# Patient Record
Sex: FEMALE | Race: NATIVE HAWAIIAN OR OTHER PACIFIC ISLANDER | URBAN - METROPOLITAN AREA
[De-identification: names, ages, dates, MRNs, and addresses within clinical notes are randomized per-mention and may not be internally consistent; named-entity substitution may affect disease eponyms.]

---

## 2021-04-16 ENCOUNTER — IMMUNIZATION (OUTPATIENT)
Dept: FAMILY MEDICINE CLINIC | Age: 46
End: 2021-04-16

## 2021-04-16 DIAGNOSIS — Z23 ENCOUNTER FOR IMMUNIZATION: Primary | ICD-10-CM

## 2021-04-16 PROCEDURE — 91301 COVID-19, MRNA, LNP-S, PF, 100MCG/0.5ML DOSE(MODERNA): CPT | Performed by: FAMILY MEDICINE

## 2021-04-16 PROCEDURE — 0011A COVID-19, MRNA, LNP-S, PF, 100MCG/0.5ML DOSE(MODERNA): CPT | Performed by: FAMILY MEDICINE

## 2021-05-11 ENCOUNTER — TELEPHONE (OUTPATIENT)
Dept: FAMILY MEDICINE CLINIC | Age: 46
End: 2021-05-11

## 2021-05-11 ENCOUNTER — OFFICE VISIT (OUTPATIENT)
Dept: FAMILY MEDICINE CLINIC | Age: 46
End: 2021-05-11

## 2021-05-11 VITALS
HEART RATE: 74 BPM | BODY MASS INDEX: 48.99 KG/M2 | OXYGEN SATURATION: 98 % | DIASTOLIC BLOOD PRESSURE: 91 MMHG | HEIGHT: 59 IN | SYSTOLIC BLOOD PRESSURE: 166 MMHG | WEIGHT: 243 LBS | TEMPERATURE: 98 F

## 2021-05-11 DIAGNOSIS — T19.2XXA RETAINED TAMPON, INITIAL ENCOUNTER: Primary | ICD-10-CM

## 2021-05-11 PROCEDURE — 99203 OFFICE O/P NEW LOW 30 MIN: CPT | Performed by: FAMILY MEDICINE

## 2021-05-11 NOTE — PROGRESS NOTES
Mita Hoyos is a 39 y.o. female   Chief Complaint   Patient presents with    Menstrual Problem         ASSESSMENT AND PLAN:    1. Retained Tampon - Despite thorough exam, no tampon found in vaginal vault. No visible trauma to cervix, and cervical os is closed. Discussed in depth with patient that there is no where else for the tampon to go. It is most likely that the tampon expelled without her realizing it, that she forgot she took it out, or that she never ended up putting it in. Told patient to watch for symptoms of a retained tampon vigilantly - including fevers/chills , abnormal vaginal discharge, pelvic pain, vomiting/diarrhea, and rash. Patient given the number for the [facility] and instructed to call with any issues and that I would call her back as soon as I got the message. SUBJECTIVE:    HPI:  Kp Cruz. Lizette Guzman is a 39 y.o. female who presents due to a lost tampon. She put one in last night and was unable to find it to take it out this morning. She does not typically use tampons -- this was her second time. However, she has a very heavy flow and wanted to be able to sleep through the night without worrying about soiling herself with blood. She dropped off her son at school early in the morning and went home and back to sleep. She woke up around 1000 and couldn't find the string. She used her hands to search deeper but didn't feel anything. She searched the bathroom and her bed and her clothes and didn't find the tampon. She tried getting into a warm bath to relax and still couldn't feel it. She heard that you can die from leaving in a tampon so she is very worried.     (Also, now that she is here at [facility] she is interested in getting plugged into primary care, in particular for a pap smear)      ROS  negative    BP (!) 166/91 (BP 1 Location: Right arm, BP Patient Position: Sitting)   Pulse 74   Temp 98 °F (36.7 °C)   Ht 4' 11.45\" (1.51 m)   Wt 243 lb (110.2 kg) LMP 05/07/2021   SpO2 98%   BMI 48.34 kg/m²     Physical Exam  Pt is nervous. Attempted to locate tampon digitally unsuccessfully; speculum was then used to explore vaginal cavity twice with no tampon identified. Cervix is closed, vaginal vault has a moderate amount of blood. No CMT, No uterine tenderness.

## 2021-05-11 NOTE — TELEPHONE ENCOUNTER
Tc from the pt. She is stating she is on her period and for the first time has put a tampon in her vagina sometime yesterday and has not been able to remove it to change it. She has tried and tried to locate the string and has taken a bath because a friend told her this would help her remove it. Nothing has worked and she is concerned. The pt stated she does not have a GYN nor does she have any insurance, or money to pay for an ED or Urgent care visit. The pt was scheduled an appt with the [facility] provider at [place] [address] today. She was scheduled for a 3pm appt she was told to arrive at 2:45pm. Dr Jolie French was contacted first and confirmed that the pt could come have the tampon removed at the [facility] today.  Мария Brgaa RN

## 2021-05-14 ENCOUNTER — IMMUNIZATION (OUTPATIENT)
Dept: FAMILY MEDICINE CLINIC | Age: 46
End: 2021-05-14

## 2021-05-14 DIAGNOSIS — Z23 ENCOUNTER FOR IMMUNIZATION: Primary | ICD-10-CM

## 2021-05-14 PROCEDURE — 0012A COVID-19, MRNA, LNP-S, PF, 100MCG/0.5ML DOSE(MODERNA): CPT | Performed by: FAMILY MEDICINE

## 2021-05-14 PROCEDURE — 91301 COVID-19, MRNA, LNP-S, PF, 100MCG/0.5ML DOSE(MODERNA): CPT | Performed by: FAMILY MEDICINE

## 2024-01-04 ENCOUNTER — TELEPHONE (OUTPATIENT)
Age: 49
End: 2024-01-04

## 2024-01-04 NOTE — TELEPHONE ENCOUNTER
Received a call from patient to AN/PAP coordinator number.     Patient said she received a bill for her appointment at the [facility] on 12/19/23. She is wondering why she received a bill and said she would like to cancel her appointment for 1/31/24 if she will have to pay. Per chart review, her appointment on 12/19/23 was with Dr. Danielle at the [facility] clinic. She has had no other procedures or appointments since that time.     I told the patient I will notify our ORW staff. There are sometimes errors with the billing system, but she should not have to pay for the visit. I told the patient to wait for a call from one of our ORWs. She verbalized understanding.     This encounter routed to MADELYN Fischer.     Cheryl Norton RN

## 2024-01-08 ENCOUNTER — TELEPHONE (OUTPATIENT)
Age: 49
End: 2024-01-08

## 2024-01-31 ENCOUNTER — OFFICE VISIT (OUTPATIENT)
Age: 49
End: 2024-01-31

## 2024-01-31 VITALS
WEIGHT: 242.2 LBS | DIASTOLIC BLOOD PRESSURE: 76 MMHG | HEART RATE: 81 BPM | SYSTOLIC BLOOD PRESSURE: 140 MMHG | BODY MASS INDEX: 49.95 KG/M2 | OXYGEN SATURATION: 95 % | TEMPERATURE: 97.7 F

## 2024-01-31 DIAGNOSIS — L91.8 SKIN TAG: ICD-10-CM

## 2024-01-31 DIAGNOSIS — Z76.0 MEDICATION REFILL: ICD-10-CM

## 2024-01-31 DIAGNOSIS — R73.03 PREDIABETES: Primary | ICD-10-CM

## 2024-01-31 DIAGNOSIS — I10 ESSENTIAL HYPERTENSION: ICD-10-CM

## 2024-01-31 PROBLEM — E78.2 MIXED HYPERLIPIDEMIA: Status: ACTIVE | Noted: 2024-01-31

## 2024-01-31 RX ORDER — LISINOPRIL AND HYDROCHLOROTHIAZIDE 12.5; 1 MG/1; MG/1
2 TABLET ORAL DAILY
Qty: 180 TABLET | Refills: 1
Start: 2024-01-31

## 2024-01-31 RX ORDER — EZETIMIBE 10 MG/1
10 TABLET ORAL DAILY
COMMUNITY
Start: 2023-07-28 | End: 2024-01-31 | Stop reason: SDUPTHER

## 2024-01-31 RX ORDER — MULTIVITAMIN
1 TABLET ORAL DAILY
COMMUNITY

## 2024-01-31 RX ORDER — EZETIMIBE 10 MG/1
10 TABLET ORAL DAILY
Qty: 90 TABLET | Refills: 1 | Status: SHIPPED | OUTPATIENT
Start: 2024-01-31

## 2024-01-31 SDOH — ECONOMIC STABILITY: INCOME INSECURITY: HOW HARD IS IT FOR YOU TO PAY FOR THE VERY BASICS LIKE FOOD, HOUSING, MEDICAL CARE, AND HEATING?: NOT HARD AT ALL

## 2024-01-31 SDOH — ECONOMIC STABILITY: FOOD INSECURITY: WITHIN THE PAST 12 MONTHS, YOU WORRIED THAT YOUR FOOD WOULD RUN OUT BEFORE YOU GOT MONEY TO BUY MORE.: NEVER TRUE

## 2024-01-31 SDOH — ECONOMIC STABILITY: FOOD INSECURITY: WITHIN THE PAST 12 MONTHS, THE FOOD YOU BOUGHT JUST DIDN'T LAST AND YOU DIDN'T HAVE MONEY TO GET MORE.: NEVER TRUE

## 2024-01-31 SDOH — ECONOMIC STABILITY: HOUSING INSECURITY
IN THE LAST 12 MONTHS, WAS THERE A TIME WHEN YOU DID NOT HAVE A STEADY PLACE TO SLEEP OR SLEPT IN A SHELTER (INCLUDING NOW)?: NO

## 2024-01-31 ASSESSMENT — PATIENT HEALTH QUESTIONNAIRE - PHQ9
2. FEELING DOWN, DEPRESSED OR HOPELESS: 0
SUM OF ALL RESPONSES TO PHQ QUESTIONS 1-9: 0
SUM OF ALL RESPONSES TO PHQ QUESTIONS 1-9: 0
1. LITTLE INTEREST OR PLEASURE IN DOING THINGS: 0
SUM OF ALL RESPONSES TO PHQ9 QUESTIONS 1 & 2: 0
SUM OF ALL RESPONSES TO PHQ QUESTIONS 1-9: 0
SUM OF ALL RESPONSES TO PHQ QUESTIONS 1-9: 0

## 2024-01-31 ASSESSMENT — ENCOUNTER SYMPTOMS
CONSTIPATION: 0
SHORTNESS OF BREATH: 0
COUGH: 0
NAUSEA: 0
DIARRHEA: 0
ABDOMINAL PAIN: 0

## 2024-01-31 NOTE — PROGRESS NOTES
Chayo Yo is a 48 y.o. female   Chief Complaint   Patient presents with    Hypertension     F/u for htn         ASSESSMENT AND PLAN:    1. Prediabetes  A1C 6.0.  Restart metformin @1000mg daily.  - metFORMIN (GLUCOPHAGE) 1000 MG tablet; Take 1 tablet by mouth daily (with breakfast)  Dispense: 180 tablet; Refill: 1    2. Essential hypertension  Improved but not quite at goal. Increase lisinopril-hctz to 2 tabs daily (20-25)  - lisinopril-hydroCHLOROthiazide (PRINZIDE;ZESTORETIC) 10-12.5 MG per tablet; Take 2 tablets by mouth daily  Dispense: 180 tablet; Refill: 1    3. Skin tag  Pt has appointment 2/9 for lipoma v EIC removal, can remove skin tags at that time.        SUBJECTIVE:    HPI:  Chayo Yo is a 48 y.o. female who presents for followup on HTN and depression.    She is taking vitamins as recommended by another physician. Needed Vit D3.  She switched vitamins because the initial one wasn't available and noticed that with the new vitamins she now has intense hunger/food cravings and if she doesn't eat she gets headaches.  She is basically eating larger portions of her food, not necessarily junk food.  She used to be stable at 220, but now is in 240s.    She is still having some tingling in her hands and her calves. Worse in the morning.    She has a lot of skin tags on her neck.    She was on Metformin 500mg and stated it didn't help so she stopped. She would be interested in trying it again.    She has two bumps near her jaw. She wonders if they're lipomas or related to her teeth. She admits to dental decay.    Depression has been stable. No issues with wellbutrin.    Review of Systems   Constitutional:  Positive for appetite change. Negative for fatigue, fever and unexpected weight change.   Eyes:  Negative for visual disturbance.   Respiratory:  Negative for cough and shortness of breath.    Cardiovascular:  Negative for chest pain and palpitations.   Gastrointestinal:  Negative for

## 2024-01-31 NOTE — PROGRESS NOTES
Patient discharged with AVS. Patient's name and  verified. Patient made aware of prescriptions sent to the pharmacy. Medication review completed. Coupon given for the pharmacy. Patient given dental resources and contact information for '[facility]' program per physician. Patient instructed to contact '[facility]' to schedule a pap smear and mammogram. Patient reminded of her appointment for next week. Patient given an opportunity to voice questions/concerns. All questions addressed. CVAN  Anna Anderson assisted.

## 2024-01-31 NOTE — PROGRESS NOTES
\"Have you been to the ER, urgent care clinic since your last visit?  Hospitalized since your last visit?\"    NO    “Have you seen or consulted any other health care providers outside of [facility] since your last visit?”    NO    “Have you had a colorectal cancer screening such as a colonoscopy/FIT/Cologuard?    NO      “Have you had a pap smear?”    NO

## 2024-02-09 ENCOUNTER — OFFICE VISIT (OUTPATIENT)
Age: 49
End: 2024-02-09

## 2024-02-09 VITALS
DIASTOLIC BLOOD PRESSURE: 86 MMHG | OXYGEN SATURATION: 94 % | WEIGHT: 247 LBS | HEART RATE: 98 BPM | TEMPERATURE: 97.9 F | BODY MASS INDEX: 50.94 KG/M2 | SYSTOLIC BLOOD PRESSURE: 146 MMHG

## 2024-02-09 DIAGNOSIS — L91.8 SKIN TAG: ICD-10-CM

## 2024-02-09 DIAGNOSIS — L72.0 EPIDERMAL INCLUSION CYST: Primary | ICD-10-CM

## 2024-02-09 ASSESSMENT — ENCOUNTER SYMPTOMS
SHORTNESS OF BREATH: 0
ABDOMINAL PAIN: 0
NAUSEA: 0
DIARRHEA: 0
COUGH: 0
CONSTIPATION: 0

## 2024-02-09 NOTE — PROGRESS NOTES
Pt's name and  verified. AVS provided. Medication reviewed and education provided. Wound care instructions provided and reviewed. Pt verbalizes understanding. Time allowed for questions, all questions answered. Constance Ogden RN    
Subjective:    Chayo Yo is a 48 y.o. female who presents for lesion evaluation and possibly removal. There {is/not:8574973318} a personal history of {lesions:435214}. There {is/not:1208414493} a family history of skin cancer. See below for history and description of each lesion.    Oubjective:   Patient appears well.   Vitals:    02/09/24 1456   BP: (!) 146/86   Pulse: 98   Temp:    SpO2:        Skin exam:  Lesion on *** with patient's observations stated as {skin sx:929893}, exam of this area shows {lesion type:928701::\"normal complete skin exam, no suspicious lesions\"}.      {ABOVE THIS USE <DOT> LESION REPEATEDLY TO DESCRIBE AS MANY LESIONS AS NEEDED. THIS STATEMENT WILL DISAPPEAR UPON SIGNING THE NOTE:08838}  Assessment:   {Skin lesion dx:5286}    Procedure Note:   After informed consent was obtained, using {Skin prep:27022::\"betadine\"} for cleansing and {local:24474858} for anesthetic, with sterile technique, {removal technique:930947} was performed. Antibiotic dressing is applied, and wound care instructions provided.  Be alert for any signs of cutaneous infection. The procedure was well tolerated without complications. Follow up: {skin lesion follow up:728293}.     {Additional procedure notes (Optional):549893259}     
up: return for suture removal in 10 days.       Using chlorhexidine for cleansing with sterile technique, scissors were used to remove 14 skin tags. Antibiotic dressing is applied, and wound care instructions provided. The procedure was well tolerated without complications.

## 2024-02-23 ENCOUNTER — OFFICE VISIT (OUTPATIENT)
Age: 49
End: 2024-02-23

## 2024-02-23 VITALS
TEMPERATURE: 98.1 F | DIASTOLIC BLOOD PRESSURE: 79 MMHG | SYSTOLIC BLOOD PRESSURE: 145 MMHG | HEART RATE: 81 BPM | WEIGHT: 248.2 LBS | BODY MASS INDEX: 51.19 KG/M2 | OXYGEN SATURATION: 97 %

## 2024-02-23 DIAGNOSIS — R22.2 MASS OF SUBCUTANEOUS TISSUE OF BACK: ICD-10-CM

## 2024-02-23 DIAGNOSIS — I10 ESSENTIAL HYPERTENSION: ICD-10-CM

## 2024-02-23 DIAGNOSIS — Z48.02 VISIT FOR SUTURE REMOVAL: Primary | ICD-10-CM

## 2024-02-23 PROCEDURE — 99214 OFFICE O/P EST MOD 30 MIN: CPT | Performed by: FAMILY MEDICINE

## 2024-02-23 PROCEDURE — 3078F DIAST BP <80 MM HG: CPT | Performed by: FAMILY MEDICINE

## 2024-02-23 PROCEDURE — 3077F SYST BP >= 140 MM HG: CPT | Performed by: FAMILY MEDICINE

## 2024-02-23 ASSESSMENT — PATIENT HEALTH QUESTIONNAIRE - PHQ9
2. FEELING DOWN, DEPRESSED OR HOPELESS: 0
SUM OF ALL RESPONSES TO PHQ QUESTIONS 1-9: 0
SUM OF ALL RESPONSES TO PHQ9 QUESTIONS 1 & 2: 0

## 2024-02-23 ASSESSMENT — ENCOUNTER SYMPTOMS
NAUSEA: 0
COUGH: 0
DIARRHEA: 0
SHORTNESS OF BREATH: 0
ABDOMINAL PAIN: 0
CONSTIPATION: 0

## 2024-02-23 NOTE — PROGRESS NOTES
Chief Complaint   Patient presents with    Suture / Staple Removal     F/U for suture removal      [facility]:  Anna.  Cori Browning LPN    Patient name and date of birth verified by .  Patient given an after visit summary.  Advised to schedule next appointment before leaving clinic office. Advised patient to record blood pressure reading twice in the morning and twice in the evening.  Informed take once she awakes, after eating and taken medication ( 1-2 hours later ) .  Advised to take metformin at dinner time.    Patient verbalized understanding of all information given at time of visit. Cori Browning LPN

## 2024-02-23 NOTE — PROGRESS NOTES
Chayo Yo is a 48 y.o. female   Chief Complaint   Patient presents with    Suture / Staple Removal     F/U for suture removal         ASSESSMENT AND PLAN:    1. Visit for suture removal  4 sutures removed.    2. Mass of subcutaneous tissue of back  May need repeat procedure for the 2 lesions. Would likely do elliptical whole excision rather than risk rupture again.    3. Essential hypertension  Keep a record of home BP.  Separate Lisinopril-HCTZ and metformin (take metformin with dinner)  Follow up in one month.      SUBJECTIVE:    HPI:  Chayo Yo is a 48 y.o. female who presents for suture removal.  She had 3 EICs removed on 2/9.  The 2 lumps that are close to eachother and more midline are still visible/palpable.  No issues with healing otherwise.    She has access to a BP machine at home but doesn't check hers.    She notes that several times after taking all of her medications together in the morning she has felt really drained and almost depressed to the point where she had to lie down.    Review of Systems   Constitutional:  Positive for fatigue. Negative for fever and unexpected weight change.   Eyes:  Negative for visual disturbance.   Respiratory:  Negative for cough and shortness of breath.    Cardiovascular:  Negative for chest pain and palpitations.   Gastrointestinal:  Negative for abdominal pain, constipation, diarrhea and nausea.   Neurological:  Negative for dizziness and headaches.         BP (!) 145/79 (Site: Left Upper Arm, Position: Sitting)   Pulse 81   Temp 98.1 °F (36.7 °C) (Temporal)   Wt 112.6 kg (248 lb 3.2 oz)   LMP 01/28/2024 (Exact Date)   SpO2 97%   BMI 51.19 kg/m²     Physical Exam  Constitutional:       General: She is not in acute distress.     Appearance: Normal appearance.   Pulmonary:      Effort: Pulmonary effort is normal.   Skin:     Comments: Incisions are well healed.  Slightly smaller but still visible and palpable lumps midscapular line on

## 2024-02-23 NOTE — PROGRESS NOTES
\"Have you been to the ER, urgent care clinic since your last visit?  Hospitalized since your last visit?\"    NO    “Have you seen or consulted any other health care providers outside of [facility] since your last visit?”    NO    “Have you had a colorectal cancer screening such as a colonoscopy/FIT/Cologuard?    NO      “Have you had a pap smear?”    NO

## 2024-03-29 ENCOUNTER — OFFICE VISIT (OUTPATIENT)
Age: 49
End: 2024-03-29

## 2024-03-29 VITALS
DIASTOLIC BLOOD PRESSURE: 89 MMHG | SYSTOLIC BLOOD PRESSURE: 148 MMHG | BODY MASS INDEX: 50.45 KG/M2 | TEMPERATURE: 97.7 F | WEIGHT: 244.6 LBS | OXYGEN SATURATION: 98 % | HEART RATE: 82 BPM

## 2024-03-29 DIAGNOSIS — M79.604 ACUTE PAIN OF RIGHT LOWER EXTREMITY: ICD-10-CM

## 2024-03-29 DIAGNOSIS — I10 ESSENTIAL HYPERTENSION: ICD-10-CM

## 2024-03-29 DIAGNOSIS — E78.2 MIXED HYPERLIPIDEMIA: ICD-10-CM

## 2024-03-29 DIAGNOSIS — F33.1 MDD (MAJOR DEPRESSIVE DISORDER), RECURRENT EPISODE, MODERATE (HCC): Primary | ICD-10-CM

## 2024-03-29 DIAGNOSIS — R73.03 PREDIABETES: Primary | ICD-10-CM

## 2024-03-29 DIAGNOSIS — Z74.8: ICD-10-CM

## 2024-03-29 LAB — GLUCOSE, POC: 99 MG/DL

## 2024-03-29 PROCEDURE — 82962 GLUCOSE BLOOD TEST: CPT | Performed by: FAMILY MEDICINE

## 2024-03-29 PROCEDURE — 3077F SYST BP >= 140 MM HG: CPT | Performed by: FAMILY MEDICINE

## 2024-03-29 PROCEDURE — 99214 OFFICE O/P EST MOD 30 MIN: CPT | Performed by: FAMILY MEDICINE

## 2024-03-29 PROCEDURE — 3079F DIAST BP 80-89 MM HG: CPT | Performed by: FAMILY MEDICINE

## 2024-03-29 PROCEDURE — 90791 PSYCH DIAGNOSTIC EVALUATION: CPT | Performed by: SOCIAL WORKER

## 2024-03-29 RX ORDER — LISINOPRIL AND HYDROCHLOROTHIAZIDE 12.5; 1 MG/1; MG/1
2 TABLET ORAL DAILY
Qty: 180 TABLET | Refills: 1 | Status: SHIPPED | OUTPATIENT
Start: 2024-03-29

## 2024-03-29 RX ORDER — AMLODIPINE BESYLATE 5 MG/1
5 TABLET ORAL DAILY
Qty: 90 TABLET | Refills: 0 | Status: SHIPPED | OUTPATIENT
Start: 2024-03-29

## 2024-03-29 ASSESSMENT — ENCOUNTER SYMPTOMS
SHORTNESS OF BREATH: 0
ABDOMINAL PAIN: 0
NAUSEA: 0
DIARRHEA: 0
CONSTIPATION: 0
COUGH: 0

## 2024-03-29 NOTE — PROGRESS NOTES
Chief Complaint   Patient presents with    Hypertension     BP (!) 148/89 (Site: Right Upper Arm, Position: Sitting, Cuff Size: Large Adult)   Pulse 82   Temp 97.7 °F (36.5 °C) (Temporal)   Wt 110.9 kg (244 lb 9.6 oz)   LMP 03/27/2024 (Exact Date)   SpO2 98%   BMI 50.45 kg/m²   \"Have you been to the ER, urgent care clinic since your last visit?  Hospitalized since your last visit?\"    NO    “Have you seen or consulted any other health care providers outside of [facility] since your last visit?”    NO     “Have you had a pap smear?”    NO    No cervical cancer screening on file       Results for orders placed or performed in visit on 03/29/24   AMB POC GLUCOSE BLOOD, BY GLUCOSE MONITORING DEVICE   Result Value Ref Range    Glucose, POC 99 MG/DL       “Have you had a colorectal cancer screening such as a colonoscopy/FIT/Cologuard?    NO    No colonoscopy on file  No cologuard on file  No FIT/FOBT on file   No flexible sigmoidoscopy on file         Click Here for Release of Records Request

## 2024-03-29 NOTE — PROGRESS NOTES
Chayo Yo is a 48 y.o. female   Chief Complaint   Patient presents with    Hypertension         ASSESSMENT AND PLAN:    1. Prediabetes  FBG 99 today which is good.  Continue lifestyle changes.  - AMB POC GLUCOSE BLOOD, BY GLUCOSE MONITORING DEVICE    2. Essential hypertension  Uncontrolled on lisinopril-hctz 20-25, will add amlodipine 5mg daily.  Monitor for peripheral edema.  - amLODIPine (NORVASC) 5 MG tablet; Take 1 tablet by mouth daily  Dispense: 90 tablet; Refill: 0    3. Mixed hyperlipidemia  4. Acute pain of right lower extremity  Hold ezetimibe in case this is an adverse effect.  Will repeat lipid panel and assess for improvement in knee pain at follow up in 6-8 weeks.        SUBJECTIVE:    HPI:  Chayo Yo is a 48 y.o. female who presents for HTN followup. She forgot to bring a BP log.  Her blood pressures have been very high at home despite taking the medications as directed. (Up to 180s sys)  No headaches, dizziness, vision changes, chest pain.    She has pain in her left knee and leg that started out of nowhere (no JUNIOR). After walking all day last week, the next day she woke up unable to walk or bend her knee. She felt a lump on her right medial knee and swelling of her anterior calf.  No pain at rest, but very bothersome with movement.  No erythema or warmth.    She used an entire tube of voltaren but it didn't help at all.  Has been taking ezetimibe for about 3 months. Had severe myalgias on atorvastatin.    Has lost 4 lbs since her last visit. She has been eating better. Wellbutrin has helped with her cravings between meals and if she feels hungry she will choose an orange or mandarin or cucumber to snack on.    Review of Systems   Constitutional:  Negative for fatigue, fever and unexpected weight change.   Eyes:  Negative for visual disturbance.   Respiratory:  Negative for cough and shortness of breath.    Cardiovascular:  Negative for chest pain and palpitations.

## 2024-03-29 NOTE — PROGRESS NOTES
INITIAL ASSESSMENT    S:  Reason for Referral: Depression; marital issues  Suspected or known special circumstances: None  Any history of active or passive suicidal thoughts, plans or actions? No  Any history of active or passive homicidal thoughts, plans or actions? No  Any history of hallucinations, audio or visual? No  Safety Concerns at this time: None identified.  Past or current court involvement? None reported.  Psych Related Medications: Currently on Welbutrin  Substance Abuse History: None reported  Family psychiatric and substance history: None reported  Diagnosis: MCKAY, Moderate, Recurrent; Relationship Stress with   Patient reports mood is depressed.    Interventions Used this Session:  X Cognitive Challenging X Exploration of Relationship Patterns X Psychoeducation    Cognitive Refocusing X Facilitate Insight  Relaxation Techniques   X Cognitive Reframing  EMDR  Review of Tx Plan/Progress    Crisis Intervention  Grief Processing  Reflect Patterns and Defenses   X Communication Skills  Guided Imagery (GIM-July Method)    Role Play/behavioral Rehearsal    DBT  Interactive Feedback   Structured Problem Solving   X Explore Behavior  Interpersonal Resolutions X Supportive Reflection   X Explore Feelings/Issues X Instilling Hope  Symptom Management   X Explore Negative Self Talk  Mindfulness Training X Other: Develop trust and rapport   X Exploration of Coping Patterns X Provide Opportunity for Emotional Expression         Homework: I created a diary for the patient in her phone for her to write, on a daily basis, positive thoughts/beliefs about herself. She is also to find ways to move her body for at least 30 minutes daily and to begin to find someone who can teach her how to drive.    O: Abbreviated Mental Status Exam deferred.  Patient presents as alert and oriented to all spheres.  Is well-groomed and appropriately dressed.  Mood and affect congruent. Full facial expressions. Speech is normal

## 2024-03-29 NOTE — PROGRESS NOTES
Chief Complaint   Patient presents with    Hypertension      [facility] services:  [id number].  Cori Browning LPN    Patient name and date of birth verified by .  Patient given an after visit summary, reviewed medications on how and when to take, coupon given to present to pharmacy for medication discount.  Advised to schedule next appointment before leaving clinic office.  Patient verbalized understanding of all information given at time of visit. Cori Browning LPN    Patient informed to stop taking the Zetia for now, patient verbalized understanding.  Cori Browning LPN

## 2024-04-11 ENCOUNTER — TELEPHONE (OUTPATIENT)
Age: 49
End: 2024-04-11

## 2024-04-11 NOTE — TELEPHONE ENCOUNTER
Returned Chayo Mariel Kim Yo phone call from earlier. Pt stated that will not be able to keep tomorrow's appointment because she does not have transportation. The appointment was rescheduled and all the appointment information was provided.    Pt verbalized understanding and had no further questions.    P/C routed to IAN Minor

## 2024-04-11 NOTE — TELEPHONE ENCOUNTER
Chayo Yo left a message requesting a call back to reschedule her behavioral health appointment that is scheduled for tomorrow, April 12, 2024.

## 2024-04-26 ENCOUNTER — OFFICE VISIT (OUTPATIENT)
Age: 49
End: 2024-04-26

## 2024-04-26 DIAGNOSIS — F33.1 MAJOR DEPRESSIVE DISORDER, RECURRENT, MODERATE (HCC): Primary | ICD-10-CM

## 2024-04-26 DIAGNOSIS — F41.1 GENERALIZED ANXIETY DISORDER: ICD-10-CM

## 2024-04-26 PROCEDURE — 90837 PSYTX W PT 60 MINUTES: CPT | Performed by: SOCIAL WORKER

## 2024-05-02 NOTE — PROGRESS NOTES
Follow Up Psychotherapy Progress Note    S: We explored the list of positive characteristics that Chayo identified as having and looked at the stressful areas of her life and how she might incorporate these traits in order to take better care emotionally of herself.      Cognitive Challenging  Mindfulness Training    Cognitive Refocusing X Opportunity for Emotional Expression    Cognitive Reframing X Psychoeducation    Communication Skills  Relaxation Techniques   X Explore Behavior  Review of Progress   X Explore Feelings/Issues X Reflect Patterns and Defenses   X Explore Negative Self Talk  Role Play/behavioral Rehearsal   X Exploration of Coping Patterns X Supportive Reflection   X Exploration of Relationship Patterns  Structured Problem Solving   X Facilitate Insight  Grief Processing    Instilling Hope     O: No safety concerns at this time. See chart for medications and diagnosis. Mental Status Exam deferred. Patient presents as alert, oriented to person, place and time. Mood and affect WNL. Appropriately dressed. Full facial expression with normal speech patterns. Behavior, judgment, and thought content WNL. Good insight with age appropriate perception and reasoning.   A: Chayo continue to work on developing her self confidence and self esteem and using her voice to ask for what she needs or to ensure she is being treated respectfully. She is holding better boundaries with her  when he is disrespectful towards her, as well.  P: Follow up in three weeks.

## 2024-05-21 ENCOUNTER — HOSPITAL ENCOUNTER (OUTPATIENT)
Facility: HOSPITAL | Age: 49
Setting detail: SPECIMEN
Discharge: HOME | End: 2024-05-24

## 2024-05-21 ENCOUNTER — OFFICE VISIT (OUTPATIENT)
Age: 49
End: 2024-05-21

## 2024-05-21 VITALS
BODY MASS INDEX: 51.36 KG/M2 | HEART RATE: 76 BPM | SYSTOLIC BLOOD PRESSURE: 139 MMHG | WEIGHT: 249 LBS | OXYGEN SATURATION: 93 % | DIASTOLIC BLOOD PRESSURE: 83 MMHG | TEMPERATURE: 97.7 F

## 2024-05-21 DIAGNOSIS — Z01.419 ENCOUNTER FOR WELL WOMAN EXAM: Primary | ICD-10-CM

## 2024-05-21 PROCEDURE — 88175 CYTOPATH C/V AUTO FLUID REDO: CPT

## 2024-05-21 PROCEDURE — 87624 HPV HI-RISK TYP POOLED RSLT: CPT

## 2024-05-21 SDOH — SOCIAL STABILITY: SOCIAL NETWORK
DO YOU BELONG TO ANY CLUBS OR ORGANIZATIONS SUCH AS CHURCH GROUPS UNIONS, FRATERNAL OR ATHLETIC GROUPS, OR SCHOOL GROUPS?: NO

## 2024-05-21 SDOH — SOCIAL STABILITY: SOCIAL NETWORK: HOW OFTEN DO YOU ATTENT MEETINGS OF THE CLUB OR ORGANIZATION YOU BELONG TO?: NEVER

## 2024-05-21 SDOH — HEALTH STABILITY: MENTAL HEALTH
STRESS IS WHEN SOMEONE FEELS TENSE, NERVOUS, ANXIOUS, OR CAN'T SLEEP AT NIGHT BECAUSE THEIR MIND IS TROUBLED. HOW STRESSED ARE YOU?: NOT AT ALL

## 2024-05-21 SDOH — SOCIAL STABILITY: SOCIAL NETWORK: IN A TYPICAL WEEK, HOW MANY TIMES DO YOU TALK ON THE PHONE WITH FAMILY, FRIENDS, OR NEIGHBORS?: ONCE A WEEK

## 2024-05-21 SDOH — HEALTH STABILITY: PHYSICAL HEALTH: ON AVERAGE, HOW MANY DAYS PER WEEK DO YOU ENGAGE IN MODERATE TO STRENUOUS EXERCISE (LIKE A BRISK WALK)?: 0 DAYS

## 2024-05-21 SDOH — ECONOMIC STABILITY: TRANSPORTATION INSECURITY
IN THE PAST 12 MONTHS, HAS THE LACK OF TRANSPORTATION KEPT YOU FROM MEDICAL APPOINTMENTS OR FROM GETTING MEDICATIONS?: NO

## 2024-05-21 SDOH — SOCIAL STABILITY: SOCIAL NETWORK: HOW OFTEN DO YOU ATTEND CHURCH OR RELIGIOUS SERVICES?: 1 TO 4 TIMES PER YEAR

## 2024-05-21 SDOH — SOCIAL STABILITY: SOCIAL NETWORK: ARE YOU MARRIED, WIDOWED, DIVORCED, SEPARATED, NEVER MARRIED, OR LIVING WITH A PARTNER?: LIVING WITH PARTNER

## 2024-05-21 SDOH — HEALTH STABILITY: PHYSICAL HEALTH: ON AVERAGE, HOW MANY MINUTES DO YOU ENGAGE IN EXERCISE AT THIS LEVEL?: 0 MIN

## 2024-05-21 SDOH — SOCIAL STABILITY: SOCIAL NETWORK: HOW OFTEN DO YOU GET TOGETHER WITH FRIENDS OR RELATIVES?: NEVER

## 2024-05-21 ASSESSMENT — PATIENT HEALTH QUESTIONNAIRE - PHQ9
SUM OF ALL RESPONSES TO PHQ QUESTIONS 1-9: 0
1. LITTLE INTEREST OR PLEASURE IN DOING THINGS: NOT AT ALL
SUM OF ALL RESPONSES TO PHQ9 QUESTIONS 1 & 2: 0
2. FEELING DOWN, DEPRESSED OR HOPELESS: NOT AT ALL

## 2024-05-21 NOTE — PROGRESS NOTES
Assessment/Plan:    Chayo was seen today for gynecologic exam.    Diagnoses and all orders for this visit:    Encounter for well woman exam  -     PAP IG, Aptima HPV and rfx 16/18,45 (); Future        No follow-up provider specified.    Page Wright PA-C  Chayo Alvarez Srinath expressed understanding of this plan. An AVS was printed and given to the patient.      ----------------------------------------------------------------------    Chief Complaint   Patient presents with    Gynecologic Exam     ROUTINE       History of Present Illness:    Pt presents for well woman (she was advised that she would have future visits at [facility] in order to combine breast services with cervical cancer screening)  She had her last mammo one year ago near [place] she states  She is  . Had BTL   long term, denies risk for DV  Having periods  No hx of [facility] pap. Explained that we do pap tests every 5 years if the results are normal. She had her last one 1-2 years ago. We don't have record so will do one today    No past medical history on file.    Current Outpatient Medications   Medication Sig Dispense Refill    amLODIPine (NORVASC) 5 MG tablet Take 1 tablet by mouth daily 90 tablet 0    lisinopril-hydroCHLOROthiazide (PRINZIDE;ZESTORETIC) 10-12.5 MG per tablet Take 2 tablets by mouth daily 180 tablet 1    Multiple Vitamin (MULTI-DAY VITAMINS) TABS Take 1 tablet by mouth daily      metFORMIN (GLUCOPHAGE) 1000 MG tablet Take 1 tablet by mouth daily (with breakfast) 180 tablet 1    buPROPion (WELLBUTRIN XL) 150 MG extended release tablet Take 1 tablet by mouth every morning 90 tablet 1     No current facility-administered medications for this visit.       No Known Allergies    Social History     Tobacco Use    Smoking status: Never     Passive exposure: Never    Smokeless tobacco: Never   Substance Use Topics    Alcohol use: Not Currently    Drug use: Never       No family history on file.    Physical

## 2024-05-21 NOTE — PROGRESS NOTES
Chayo Quirosee Kim Yo seen at discharge. Full name and  verified; After visit Summary was given. RN reviewed today's visit with patient, as well as instructions on when it is recommended to return for follow-up visit PRN. RN reviewed the provider's instructions with the patient, answering all questions to her satisfaction. Patient verbalized understanding. Provided patient with information pamphlet and phone # for [facility].  Explained that they will do a financial screening when they call before scheduling for an appointment. I explained to the patient that they will need to tell screener how many in the household are working and how much they are earning. she was instructed to call and set up an appointment at her earliest convenience and that the program has its own financial screening and requirements  Due to language barrier, an  (Mony) assisted during this encounter.   STEPHANIE TAPIA RN

## 2024-05-21 NOTE — PROGRESS NOTES
[facility]    When was your last pap smear and where? 2022    Any abnormal results from previous pap smears? NO    Any problems with your breasts? NO    Any family history of breast/ovarian cancer? NO    Do you have any kids? 2    Oldest 15 YEARS youngest 10 YEARS    Are you sexually active? YES    Are you using any type of birth control? If yes where do you go for this? NO    Abuse screening completed this visit? YES

## 2024-05-24 ENCOUNTER — OFFICE VISIT (OUTPATIENT)
Age: 49
End: 2024-05-24

## 2024-05-24 ENCOUNTER — HOSPITAL ENCOUNTER (OUTPATIENT)
Facility: HOSPITAL | Age: 49
Setting detail: SPECIMEN
Discharge: HOME | End: 2024-05-27

## 2024-05-24 VITALS
TEMPERATURE: 97.7 F | OXYGEN SATURATION: 95 % | BODY MASS INDEX: 50.28 KG/M2 | DIASTOLIC BLOOD PRESSURE: 82 MMHG | SYSTOLIC BLOOD PRESSURE: 130 MMHG | HEART RATE: 78 BPM | WEIGHT: 243.8 LBS

## 2024-05-24 DIAGNOSIS — G56.03 BILATERAL CARPAL TUNNEL SYNDROME: ICD-10-CM

## 2024-05-24 DIAGNOSIS — R73.03 PREDIABETES: ICD-10-CM

## 2024-05-24 DIAGNOSIS — I10 ESSENTIAL HYPERTENSION: Primary | ICD-10-CM

## 2024-05-24 DIAGNOSIS — Z63.0 PARTNER RELATIONSHIP PROBLEM: ICD-10-CM

## 2024-05-24 DIAGNOSIS — E78.2 MIXED HYPERLIPIDEMIA: ICD-10-CM

## 2024-05-24 DIAGNOSIS — F33.1 MAJOR DEPRESSIVE DISORDER, RECURRENT, MODERATE (HCC): Primary | ICD-10-CM

## 2024-05-24 DIAGNOSIS — F33.1 MDD (MAJOR DEPRESSIVE DISORDER), RECURRENT EPISODE, MODERATE (HCC): ICD-10-CM

## 2024-05-24 DIAGNOSIS — R63.2 INCREASED APPETITE: ICD-10-CM

## 2024-05-24 LAB
ALBUMIN SERPL-MCNC: 3.8 G/DL (ref 3.5–5)
ALBUMIN/GLOB SERPL: 1 (ref 1.1–2.2)
ALP SERPL-CCNC: 76 U/L (ref 45–117)
ALT SERPL-CCNC: 25 U/L (ref 12–78)
ANION GAP SERPL CALC-SCNC: 3 MMOL/L (ref 5–15)
AST SERPL-CCNC: 16 U/L (ref 15–37)
BILIRUB SERPL-MCNC: 0.4 MG/DL (ref 0.2–1)
BUN SERPL-MCNC: 16 MG/DL (ref 6–20)
BUN/CREAT SERPL: 26 (ref 12–20)
CALCIUM SERPL-MCNC: 9.2 MG/DL (ref 8.5–10.1)
CHLORIDE SERPL-SCNC: 107 MMOL/L (ref 97–108)
CHOLEST SERPL-MCNC: 270 MG/DL
CO2 SERPL-SCNC: 29 MMOL/L (ref 21–32)
CREAT SERPL-MCNC: 0.62 MG/DL (ref 0.55–1.02)
EST. AVERAGE GLUCOSE BLD GHB EST-MCNC: 123 MG/DL
GLOBULIN SER CALC-MCNC: 3.7 G/DL (ref 2–4)
GLUCOSE SERPL-MCNC: 99 MG/DL (ref 65–100)
GLUCOSE, POC: 80 MG/DL
HBA1C MFR BLD: 5.9 % (ref 4–5.6)
HDLC SERPL-MCNC: 92 MG/DL
HDLC SERPL: 2.9 (ref 0–5)
LDLC SERPL CALC-MCNC: 134.6 MG/DL (ref 0–100)
POTASSIUM SERPL-SCNC: 4.1 MMOL/L (ref 3.5–5.1)
PROT SERPL-MCNC: 7.5 G/DL (ref 6.4–8.2)
SODIUM SERPL-SCNC: 139 MMOL/L (ref 136–145)
TRIGL SERPL-MCNC: 217 MG/DL
VLDLC SERPL CALC-MCNC: 43.4 MG/DL

## 2024-05-24 PROCEDURE — 36415 COLL VENOUS BLD VENIPUNCTURE: CPT

## 2024-05-24 PROCEDURE — 3079F DIAST BP 80-89 MM HG: CPT | Performed by: FAMILY MEDICINE

## 2024-05-24 PROCEDURE — 80061 LIPID PANEL: CPT

## 2024-05-24 PROCEDURE — 3075F SYST BP GE 130 - 139MM HG: CPT | Performed by: FAMILY MEDICINE

## 2024-05-24 PROCEDURE — 82962 GLUCOSE BLOOD TEST: CPT | Performed by: FAMILY MEDICINE

## 2024-05-24 PROCEDURE — 99214 OFFICE O/P EST MOD 30 MIN: CPT | Performed by: FAMILY MEDICINE

## 2024-05-24 PROCEDURE — 80053 COMPREHEN METABOLIC PANEL: CPT

## 2024-05-24 PROCEDURE — 83036 HEMOGLOBIN GLYCOSYLATED A1C: CPT

## 2024-05-24 PROCEDURE — 90837 PSYTX W PT 60 MINUTES: CPT | Performed by: SOCIAL WORKER

## 2024-05-24 RX ORDER — BUPROPION HYDROCHLORIDE 300 MG/1
300 TABLET ORAL EVERY MORNING
Qty: 90 TABLET | Refills: 0 | Status: SHIPPED | OUTPATIENT
Start: 2024-05-24

## 2024-05-24 RX ORDER — LISINOPRIL AND HYDROCHLOROTHIAZIDE 25; 20 MG/1; MG/1
1 TABLET ORAL DAILY
Qty: 90 TABLET | Refills: 1 | Status: SHIPPED | OUTPATIENT
Start: 2024-05-24

## 2024-05-24 SDOH — SOCIAL STABILITY - SOCIAL INSECURITY: PROBLEMS IN RELATIONSHIP WITH SPOUSE OR PARTNER: Z63.0

## 2024-05-24 ASSESSMENT — ENCOUNTER SYMPTOMS
COUGH: 0
CONSTIPATION: 0
DIARRHEA: 0
NAUSEA: 0
ABDOMINAL PAIN: 0
SHORTNESS OF BREATH: 0
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
## 2024-05-24 NOTE — PROGRESS NOTES
Chayo Yo seen at discharge. Full name and  verified; After visit Summary was given. RN reviewed today's visit with patient, as well as instructions on when it is recommended to return for follow-up visit PRN. RN reviewed the provider's instructions with the patient, answering all questions to her satisfaction. Patient verbalized understanding. Due to language barrier, an  assisted during this encounter.   [facility] coupon(s) provided to patient for the prescribed medication(s).  STEPHANIE TAPIA RN    
Chief Complaint   Patient presents with    Blood Pressure Check    Leg Pain     /82 (Site: Left Upper Arm, Position: Sitting, Cuff Size: Medium Adult)   Pulse 78   Temp 97.7 °F (36.5 °C) (Temporal)   Wt 110.6 kg (243 lb 12.8 oz)   LMP 04/28/2024 (Exact Date)   SpO2 95%   BMI 50.28 kg/m²     \"Have you been to the ER, urgent care clinic since your last visit?  Hospitalized since your last visit?\"    NO    “Have you seen or consulted any other health care providers outside of [facility] since your last visit?”    NO        “Have you had a colorectal cancer screening such as a colonoscopy/FIT/Cologuard?    NO    No colonoscopy on file  No cologuard on file  No FIT/FOBT on file   No flexible sigmoidoscopy on file         Click Here for Release of Records Request    
Results for orders placed or performed in visit on 05/24/24   AMB POC GLUCOSE BLOOD, BY GLUCOSE MONITORING DEVICE   Result Value Ref Range    Glucose, POC 80 MG/DL     Fasting  
tunnel before, but it resolved on it's own.    Review of Systems   Constitutional:  Positive for fatigue. Negative for fever and unexpected weight change.   Eyes:  Negative for visual disturbance.   Respiratory:  Negative for cough and shortness of breath.    Cardiovascular:  Negative for chest pain, palpitations and leg swelling.   Gastrointestinal:  Negative for abdominal pain, constipation, diarrhea and nausea.   Endocrine: Negative for polydipsia and polyuria.   Musculoskeletal:  Negative for myalgias.   Neurological:  Positive for numbness. Negative for dizziness and headaches.   Psychiatric/Behavioral:  Positive for dysphoric mood.          /82 (Site: Left Upper Arm, Position: Sitting, Cuff Size: Medium Adult)   Pulse 78   Temp 97.7 °F (36.5 °C) (Temporal)   Wt 110.6 kg (243 lb 12.8 oz)   LMP 04/28/2024 (Exact Date)   SpO2 95%   BMI 50.28 kg/m²     Physical Exam  Constitutional:       General: She is not in acute distress.     Appearance: Normal appearance.   HENT:      Mouth/Throat:      Mouth: Mucous membranes are moist.      Pharynx: Oropharynx is clear. No oropharyngeal exudate or posterior oropharyngeal erythema.   Eyes:      Extraocular Movements: Extraocular movements intact.      Conjunctiva/sclera: Conjunctivae normal.      Pupils: Pupils are equal, round, and reactive to light.   Cardiovascular:      Rate and Rhythm: Normal rate and regular rhythm.      Pulses: Normal pulses.      Heart sounds: Normal heart sounds.   Pulmonary:      Effort: Pulmonary effort is normal.      Breath sounds: Normal breath sounds.   Musculoskeletal:      Cervical back: No tenderness.   Lymphadenopathy:      Cervical: No cervical adenopathy.   Neurological:      Mental Status: She is alert and oriented to person, place, and time.      Comments: Negative tinel  Positive Phalen.

## 2024-05-29 DIAGNOSIS — E78.2 MIXED HYPERLIPIDEMIA: Primary | ICD-10-CM

## 2024-05-29 RX ORDER — ROSUVASTATIN CALCIUM 5 MG/1
5 TABLET, COATED ORAL EVERY OTHER DAY
Qty: 30 TABLET | Refills: 0 | Status: SHIPPED | OUTPATIENT
Start: 2024-05-29

## 2024-05-29 NOTE — RESULT ENCOUNTER NOTE
Please let the pt know that her A1C is a little better at 5.9. This is good, continue metformin.  Her liver, kidney and electrolyte labs are normal.    Her cholesterol is high again off of medication. I'm going to start her on a different statin - rosuvastatin. We will start with a very low dose and I'd like her to take it every other day.  She should stop taking it and let me know if she develops muscle cramps again.  I sent it to [facility].

## 2024-06-20 ENCOUNTER — TELEPHONE (OUTPATIENT)
Age: 49
End: 2024-06-20

## 2024-06-20 NOTE — TELEPHONE ENCOUNTER
Patient called in stating that she will not be able to attend visit on 06.21.24. Patient is having difficulties with transportation and does not know when she will be able to reschedule.

## 2024-09-27 ENCOUNTER — TELEPHONE (OUTPATIENT)
Age: 49
End: 2024-09-27

## 2024-09-27 DIAGNOSIS — I10 ESSENTIAL HYPERTENSION: ICD-10-CM

## 2024-09-27 DIAGNOSIS — E78.2 MIXED HYPERLIPIDEMIA: ICD-10-CM

## 2024-09-27 RX ORDER — ROSUVASTATIN CALCIUM 5 MG/1
5 TABLET, COATED ORAL EVERY OTHER DAY
Qty: 90 TABLET | Refills: 3 | Status: SHIPPED | OUTPATIENT
Start: 2024-09-27

## 2024-09-27 RX ORDER — AMLODIPINE BESYLATE 5 MG/1
5 TABLET ORAL DAILY
Qty: 90 TABLET | Refills: 3 | Status: SHIPPED | OUTPATIENT
Start: 2024-09-27

## 2024-09-27 NOTE — TELEPHONE ENCOUNTER
Patient is requesting refill on medication rosuvastatin and amlodipine, pt was due for follow up in August, scheduled for sept 12 th next available, will need temporary refill, please advise?

## 2024-10-01 ENCOUNTER — TELEPHONE (OUTPATIENT)
Age: 49
End: 2024-10-01

## 2024-10-01 NOTE — TELEPHONE ENCOUNTER
Tc to the pt. The pt verified her name and . The pt was asked if she knew about her medications that had been refilled on 24. She did not know that they had been refilled.  The pt is aware of her appt on 24. She is asking if she will have labs done at this appt and if she has to be fasting since the appt in in the afternoon 2 pm. Last labs for lipids was 24. Hannah Burgos RN

## 2024-10-01 NOTE — TELEPHONE ENCOUNTER
Tc to the pt to check, and see if she had gone to  her medications that were refilled that she had requested. No answer. I left a message. I attempted to call again. No answer. Hannah Burgos RN

## 2024-11-19 ENCOUNTER — OFFICE VISIT (OUTPATIENT)
Age: 49
End: 2024-11-19

## 2024-11-19 ENCOUNTER — HOSPITAL ENCOUNTER (OUTPATIENT)
Facility: HOSPITAL | Age: 49
Setting detail: SPECIMEN
Discharge: HOME | End: 2024-11-22

## 2024-11-19 VITALS
HEART RATE: 88 BPM | BODY MASS INDEX: 50.12 KG/M2 | OXYGEN SATURATION: 93 % | WEIGHT: 243 LBS | DIASTOLIC BLOOD PRESSURE: 75 MMHG | TEMPERATURE: 97 F | SYSTOLIC BLOOD PRESSURE: 126 MMHG

## 2024-11-19 DIAGNOSIS — E78.2 MIXED HYPERLIPIDEMIA: ICD-10-CM

## 2024-11-19 DIAGNOSIS — I10 ESSENTIAL HYPERTENSION: ICD-10-CM

## 2024-11-19 DIAGNOSIS — R68.89 FLU-LIKE SYMPTOMS: ICD-10-CM

## 2024-11-19 DIAGNOSIS — I10 ESSENTIAL HYPERTENSION: Primary | ICD-10-CM

## 2024-11-19 DIAGNOSIS — R73.03 PREDIABETES: ICD-10-CM

## 2024-11-19 DIAGNOSIS — R63.2 INCREASED APPETITE: ICD-10-CM

## 2024-11-19 LAB
ALBUMIN SERPL-MCNC: 4.1 G/DL (ref 3.5–5)
ALBUMIN/GLOB SERPL: 1 (ref 1.1–2.2)
ALP SERPL-CCNC: 84 U/L (ref 45–117)
ALT SERPL-CCNC: 23 U/L (ref 12–78)
ANION GAP SERPL CALC-SCNC: 8 MMOL/L (ref 2–12)
AST SERPL-CCNC: 10 U/L (ref 15–37)
BILIRUB SERPL-MCNC: 0.4 MG/DL (ref 0.2–1)
BUN SERPL-MCNC: 17 MG/DL (ref 6–20)
BUN/CREAT SERPL: 19 (ref 12–20)
CALCIUM SERPL-MCNC: 9.8 MG/DL (ref 8.5–10.1)
CHLORIDE SERPL-SCNC: 101 MMOL/L (ref 97–108)
CHOLEST SERPL-MCNC: 197 MG/DL
CO2 SERPL-SCNC: 31 MMOL/L (ref 21–32)
CREAT SERPL-MCNC: 0.91 MG/DL (ref 0.55–1.02)
EST. AVERAGE GLUCOSE BLD GHB EST-MCNC: 111 MG/DL
GLOBULIN SER CALC-MCNC: 4.1 G/DL (ref 2–4)
GLUCOSE SERPL-MCNC: 108 MG/DL (ref 65–100)
HBA1C MFR BLD: 5.5 % (ref 4–5.6)
HDLC SERPL-MCNC: 68 MG/DL
HDLC SERPL: 2.9 (ref 0–5)
LDLC SERPL CALC-MCNC: 103.6 MG/DL (ref 0–100)
POTASSIUM SERPL-SCNC: 3.7 MMOL/L (ref 3.5–5.1)
PROT SERPL-MCNC: 8.2 G/DL (ref 6.4–8.2)
SODIUM SERPL-SCNC: 140 MMOL/L (ref 136–145)
TRIGL SERPL-MCNC: 127 MG/DL
VLDLC SERPL CALC-MCNC: 25.4 MG/DL

## 2024-11-19 PROCEDURE — 80061 LIPID PANEL: CPT

## 2024-11-19 PROCEDURE — 3074F SYST BP LT 130 MM HG: CPT | Performed by: FAMILY MEDICINE

## 2024-11-19 PROCEDURE — 80053 COMPREHEN METABOLIC PANEL: CPT

## 2024-11-19 PROCEDURE — 83036 HEMOGLOBIN GLYCOSYLATED A1C: CPT

## 2024-11-19 PROCEDURE — 99214 OFFICE O/P EST MOD 30 MIN: CPT | Performed by: FAMILY MEDICINE

## 2024-11-19 PROCEDURE — 3078F DIAST BP <80 MM HG: CPT | Performed by: FAMILY MEDICINE

## 2024-11-19 RX ORDER — BUPROPION HYDROCHLORIDE 300 MG/1
300 TABLET ORAL EVERY MORNING
Qty: 90 TABLET | Refills: 1 | Status: SHIPPED | OUTPATIENT
Start: 2024-11-19

## 2024-11-19 RX ORDER — ROSUVASTATIN CALCIUM 5 MG/1
5 TABLET, COATED ORAL EVERY OTHER DAY
Qty: 90 TABLET | Refills: 0 | Status: SHIPPED | OUTPATIENT
Start: 2024-11-19

## 2024-11-19 ASSESSMENT — PATIENT HEALTH QUESTIONNAIRE - PHQ9
SUM OF ALL RESPONSES TO PHQ9 QUESTIONS 1 & 2: 0
SUM OF ALL RESPONSES TO PHQ QUESTIONS 1-9: 0
2. FEELING DOWN, DEPRESSED OR HOPELESS: NOT AT ALL
SUM OF ALL RESPONSES TO PHQ QUESTIONS 1-9: 0
1. LITTLE INTEREST OR PLEASURE IN DOING THINGS: NOT AT ALL
SUM OF ALL RESPONSES TO PHQ QUESTIONS 1-9: 0
SUM OF ALL RESPONSES TO PHQ QUESTIONS 1-9: 0

## 2024-11-19 ASSESSMENT — ENCOUNTER SYMPTOMS
CONSTIPATION: 0
COUGH: 1
DIARRHEA: 0
SHORTNESS OF BREATH: 0
NAUSEA: 0
ABDOMINAL PAIN: 0

## 2024-11-19 NOTE — PROGRESS NOTES
Chayo Yo seen at d/c, full name and  verified, given After visit Summary and reviewed today's visit with patient along with instructions on when it is recommended to come back (Return in about 6 months (around 2025) for HTN, HLD, prediabetes. )

## 2024-11-19 NOTE — PROGRESS NOTES
Chayo oY is a 48 y.o. female   Chief Complaint   Patient presents with    Hypertension     6 month f/u    Cholesterol Problem     6 month f/u         ASSESSMENT AND PLAN:    1. Essential hypertension  At goal on lisinopril-hctz 20-25 and amlodipine 5mg.  - Comprehensive Metabolic Panel; Future    2. Increased appetite  Better control of impulse/binge eating on bupropion. Continue.  Discussed tapering down when she decides to stop.  - buPROPion (WELLBUTRIN XL) 300 MG extended release tablet; Take 1 tablet by mouth every morning  Dispense: 90 tablet; Refill: 1    3. Flu-like symptoms  Likely viral. Continue symptomatic management.    4. Mixed hyperlipidemia  Repeat labs.  Continue q2 day crestor  - Lipid Panel; Future  - rosuvastatin (CRESTOR) 5 MG tablet; Take 1 tablet by mouth every other day  Dispense: 90 tablet; Refill: 0    5. Prediabetes  - Hemoglobin A1C; Future    SUBJECTIVE:    HPI:  Chayo Yo is a 48 y.o. female who presents for HTN, HLD, weight followup.  Last Saturday she developed  headaches and a fever. She also has a mild cough. No nasal congestion.  She has taken ibuprofen PRN for headache or fever - which helps.    Tolerating q2 day crestor without side effects  and in fact has noticed her hand N/T has gotten much better - it used to be constant but no it's rare.    She is having ups and downs with her weight. No big difference with bupropion increase in terms of weight, but is has helped curb her hunger cues/satiety.    She is having epigastric pain currently, but she hasn't eaten today in anticipation of labwork.    Review of Systems   Constitutional:  Positive for fever. Negative for fatigue and unexpected weight change.   Eyes:  Negative for visual disturbance.   Respiratory:  Positive for cough. Negative for shortness of breath.    Cardiovascular:  Negative for chest pain and palpitations.   Gastrointestinal:  Negative for abdominal pain, constipation, diarrhea and

## 2024-11-21 NOTE — RESULT ENCOUNTER NOTE
Please let the pt know that her labs look good.  Her liver, kidney and electrolytes are normal.  Her cholesterol is controlled on rosuvastatin  Her blood sugar is better, and no longer in the prediabetes range on metformin.    Continue current meds.

## 2025-02-01 DIAGNOSIS — R73.03 PREDIABETES: ICD-10-CM

## 2025-03-15 ENCOUNTER — HOSPITAL ENCOUNTER (EMERGENCY)
Facility: HOSPITAL | Age: 50
Discharge: HOME | End: 2025-03-15
Attending: EMERGENCY MEDICINE

## 2025-03-15 ENCOUNTER — APPOINTMENT (OUTPATIENT)
Facility: HOSPITAL | Age: 50
End: 2025-03-15

## 2025-03-15 VITALS
TEMPERATURE: 98.2 F | HEART RATE: 117 BPM | BODY MASS INDEX: 48.1 KG/M2 | SYSTOLIC BLOOD PRESSURE: 140 MMHG | WEIGHT: 245 LBS | DIASTOLIC BLOOD PRESSURE: 93 MMHG | HEIGHT: 60 IN | RESPIRATION RATE: 20 BRPM | OXYGEN SATURATION: 98 %

## 2025-03-15 DIAGNOSIS — M25.562 ACUTE PAIN OF LEFT KNEE: ICD-10-CM

## 2025-03-15 DIAGNOSIS — S93.402A SPRAIN OF LEFT ANKLE, UNSPECIFIED LIGAMENT, INITIAL ENCOUNTER: Primary | ICD-10-CM

## 2025-03-15 PROCEDURE — 73562 X-RAY EXAM OF KNEE 3: CPT

## 2025-03-15 PROCEDURE — 6370000000 HC RX 637 (ALT 250 FOR IP): Performed by: EMERGENCY MEDICINE

## 2025-03-15 PROCEDURE — 73610 X-RAY EXAM OF ANKLE: CPT

## 2025-03-15 PROCEDURE — 99283 EMERGENCY DEPT VISIT LOW MDM: CPT

## 2025-03-15 RX ORDER — IBUPROFEN 600 MG/1
600 TABLET, FILM COATED ORAL
Status: COMPLETED | OUTPATIENT
Start: 2025-03-15 | End: 2025-03-15

## 2025-03-15 RX ADMIN — IBUPROFEN 600 MG: 600 TABLET, FILM COATED ORAL at 19:55

## 2025-03-15 ASSESSMENT — LIFESTYLE VARIABLES
HOW MANY STANDARD DRINKS CONTAINING ALCOHOL DO YOU HAVE ON A TYPICAL DAY: PATIENT DOES NOT DRINK
HOW OFTEN DO YOU HAVE A DRINK CONTAINING ALCOHOL: NEVER

## 2025-03-15 ASSESSMENT — PAIN - FUNCTIONAL ASSESSMENT: PAIN_FUNCTIONAL_ASSESSMENT: 0-10

## 2025-03-15 ASSESSMENT — ENCOUNTER SYMPTOMS
COUGH: 0
VOMITING: 0
SORE THROAT: 0

## 2025-03-15 ASSESSMENT — PAIN DESCRIPTION - PAIN TYPE: TYPE: ACUTE PAIN

## 2025-03-15 ASSESSMENT — PAIN DESCRIPTION - LOCATION: LOCATION: KNEE;ANKLE

## 2025-03-15 ASSESSMENT — PAIN SCALES - GENERAL: PAINLEVEL_OUTOF10: 6

## 2025-03-15 NOTE — ED PROVIDER NOTES
[place] EMERGENCY DEPARTMENT  EMERGENCY DEPARTMENT ENCOUNTER      Pt Name: Chayo Yo  MRN: [id number]  Birthdate 1975  Date of evaluation: 3/15/2025  Provider: Chris Briones MD    CHIEF COMPLAINT       Chief Complaint   Patient presents with    Knee Pain    Ankle Pain         HISTORY OF PRESENT ILLNESS   (Location/Symptom, Timing/Onset, Context/Setting, Quality, Duration, Modifying Factors, Severity)  Note limiting factors.   39-year-old female presents with injury to her left knee and left ankle.  Happened earlier today when she was leaving [employer] and fell down 4 steps.  Denies any head injury.  Is able to ambulate but with pain.    The history is provided by the patient and a relative. The history is limited by a language barrier.  used: Patient's son is translating.         Review of External Medical Records:     Nursing Notes were reviewed.    REVIEW OF SYSTEMS    (2-9 systems for level 4, 10 or more for level 5)     Review of Systems   Constitutional:  Negative for fatigue.   HENT:  Negative for sore throat.    Eyes:  Negative for visual disturbance.   Respiratory:  Negative for cough.    Cardiovascular:  Negative for palpitations.   Gastrointestinal:  Negative for vomiting.   Genitourinary:  Negative for difficulty urinating.   Musculoskeletal:  Negative for myalgias.   Skin:  Negative for rash.   Neurological:  Negative for weakness.       Except as noted above the remainder of the review of systems was reviewed and negative.       PAST MEDICAL HISTORY   No past medical history on file.      SURGICAL HISTORY     No past surgical history on file.      CURRENT MEDICATIONS       Previous Medications    AMLODIPINE (NORVASC) 5 MG TABLET    Take 1 tablet by mouth daily    BUPROPION (WELLBUTRIN XL) 300 MG EXTENDED RELEASE TABLET    Take 1 tablet by mouth every morning    LISINOPRIL-HYDROCHLOROTHIAZIDE (PRINZIDE;ZESTORETIC) 20-25 MG PER TABLET    Take 1 tablet by mouth

## 2025-03-15 NOTE — ED TRIAGE NOTES
To ED per w/c with sons who interpret that mother fell down four steps at [employer] today and has pain to right knee and left ankle.  Able to bear weight, but states is painful.    No OTC meds PTA

## 2025-03-15 NOTE — ED NOTES
right knee and left ankle pain r/t fall down 4 steps today at [employer]. Denies hitting her head, denies LOC.

## 2025-03-18 ENCOUNTER — TELEPHONE (OUTPATIENT)
Age: 50
End: 2025-03-18

## 2025-03-18 NOTE — TELEPHONE ENCOUNTER
T/C made to the patient with assistance from [facility]  #[id number] to follow-up after her [facility] ED visit on 03-15-25.    There was no answer at the patient's listed home phone number so a message was left asking for a return call to nurse Blanton at the [facility], [phone], about her recent ED visit. Franny Encinas RN, Nurse Navigator

## 2025-03-19 ENCOUNTER — TELEPHONE (OUTPATIENT)
Age: 50
End: 2025-03-19

## 2025-04-04 NOTE — TELEPHONE ENCOUNTER
Patient left me a voicemail in response to the message I left her on 03-18-25 asking for a return call about her 03-15-25 [place] ED visit.    Return call made to the patient with assistance from [facility]  #[id number]. There was no answer at her listed home number so a message was left letting her know I was returning her call and to please call me back at [phone]. Franny Encinas RN, Nurse Navigator

## 2025-04-22 ENCOUNTER — TELEPHONE (OUTPATIENT)
Age: 50
End: 2025-04-22

## 2025-04-22 DIAGNOSIS — Z71.89 COUNSELING AND COORDINATION OF CARE: Primary | ICD-10-CM

## 2025-04-22 NOTE — TELEPHONE ENCOUNTER
Received phone call from patient stating she has received a medical bill and is requesting appointment with an . Patient was made aware that a message would be routed to an  and to be aware of their phone call. Patient verbalized understanding with no further questions.     Routing call to Linus Anna

## 2025-04-23 NOTE — TELEPHONE ENCOUNTER
[facility] contacted patient regarding medical bills. [facility] FA screening has been started. Pending support letter and bank statement. Patient will call [facility] back to schedule an appointment. Patient verbalized understanding the process.

## 2025-05-05 NOTE — TELEPHONE ENCOUNTER
Request for refill of metformin 1,000 mg tablets received from patient's preferred [facility] pharmacy. Antihyperglycemics protocol passed. Patient has upcoming appointment scheduled with PCP BEATRIZ Danielle MD on 06/17/2025.  STEPHANIE TAPIA RN

## 2025-05-06 ENCOUNTER — TELEPHONE (OUTPATIENT)
Age: 50
End: 2025-05-06

## 2025-05-06 DIAGNOSIS — Z71.89 COUNSELING AND COORDINATION OF CARE: Primary | ICD-10-CM

## 2025-05-06 NOTE — TELEPHONE ENCOUNTER
Patient contacted [facility] for an appointment to complete [facility] FA application. An appointment has been scheduled for 5/9/25. Patient verbalized understanding the process.

## 2025-05-12 ENCOUNTER — OFFICE VISIT (OUTPATIENT)
Age: 50
End: 2025-05-12

## 2025-05-12 DIAGNOSIS — Z71.89 COUNSELING AND COORDINATION OF CARE: Primary | ICD-10-CM

## 2025-05-12 ASSESSMENT — SOCIAL DETERMINANTS OF HEALTH (SDOH)
WITHIN THE LAST YEAR, HAVE TO BEEN RAPED OR FORCED TO HAVE ANY KIND OF SEXUAL ACTIVITY BY YOUR PARTNER OR EX-PARTNER?: NO
WITHIN THE LAST YEAR, HAVE YOU BEEN HUMILIATED OR EMOTIONALLY ABUSED IN OTHER WAYS BY YOUR PARTNER OR EX-PARTNER?: NO
WITHIN THE LAST YEAR, HAVE YOU BEEN KICKED, HIT, SLAPPED, OR OTHERWISE PHYSICALLY HURT BY YOUR PARTNER OR EX-PARTNER?: NO
WITHIN THE LAST YEAR, HAVE YOU BEEN AFRAID OF YOUR PARTNER OR EX-PARTNER?: NO

## 2025-06-18 ENCOUNTER — HOSPITAL ENCOUNTER (OUTPATIENT)
Facility: HOSPITAL | Age: 50
Discharge: HOME | End: 2025-06-21
Attending: FAMILY MEDICINE

## 2025-06-18 DIAGNOSIS — Z12.31 BREAST CANCER SCREENING BY MAMMOGRAM: ICD-10-CM

## 2025-06-18 PROCEDURE — 77063 BREAST TOMOSYNTHESIS BI: CPT

## 2025-07-14 ENCOUNTER — TELEPHONE (OUTPATIENT)
Age: 50
End: 2025-07-14

## 2025-07-14 DIAGNOSIS — Z71.89 COUNSELING AND COORDINATION OF CARE: Primary | ICD-10-CM

## 2025-07-14 NOTE — TELEPHONE ENCOUNTER
Patient contacted [facility] back to informed she called [facility] FA office and they requested to fill out another application. Patient dropped application at [facility] urgent care but cannot be found. [facility] explained always has to be at any [facility] hospital and not urgent care. Patient will contact [facility] back to schedule an appointment to complete [facility] FA application. Patient verbalized understanding the process.

## 2025-07-14 NOTE — TELEPHONE ENCOUNTER
Patient contacted [facility] regarding [facility] FA follow up. Per chart case still in progress since June 12. [facility] provided to patient phone number to contact [facility] FA office.Patient will contact them for follow up and verbalized understanding the process.